# Patient Record
Sex: FEMALE | Race: WHITE | NOT HISPANIC OR LATINO
[De-identification: names, ages, dates, MRNs, and addresses within clinical notes are randomized per-mention and may not be internally consistent; named-entity substitution may affect disease eponyms.]

---

## 2022-09-28 DIAGNOSIS — R92.2 INCONCLUSIVE MAMMOGRAM: ICD-10-CM

## 2022-09-28 DIAGNOSIS — Z11.3 ENCOUNTER FOR SCREENING FOR INFECTIONS WITH A PREDOMINANTLY SEXUAL MODE OF TRANSMISSION: ICD-10-CM

## 2022-09-28 DIAGNOSIS — Z12.31 ENCOUNTER FOR SCREENING MAMMOGRAM FOR MALIGNANT NEOPLASM OF BREAST: ICD-10-CM

## 2022-09-30 ENCOUNTER — NON-APPOINTMENT (OUTPATIENT)
Age: 42
End: 2022-09-30

## 2022-10-03 ENCOUNTER — NON-APPOINTMENT (OUTPATIENT)
Age: 42
End: 2022-10-03

## 2022-10-03 ENCOUNTER — APPOINTMENT (OUTPATIENT)
Dept: OBGYN | Facility: CLINIC | Age: 42
End: 2022-10-03

## 2022-10-03 VITALS
HEIGHT: 63 IN | DIASTOLIC BLOOD PRESSURE: 80 MMHG | SYSTOLIC BLOOD PRESSURE: 120 MMHG | WEIGHT: 162 LBS | BODY MASS INDEX: 28.7 KG/M2

## 2022-10-03 PROCEDURE — 99386 PREV VISIT NEW AGE 40-64: CPT

## 2022-10-03 NOTE — HISTORY OF PRESENT ILLNESS
[TextBox_4] : 41yo  here for new gyn visit.\par H/o abnormal pap in 2006- treated with LEEP.  No cervical shortening during pregnancy.\par H/o PEC with pregnancy and states that her BP has not been the same since then (used to run low and now is 120/80 normally).\par Currently, she has reg periods and no gyn complaints.\par \par She is ; has twins- boy and girl who are 12yo and go to [place].  She is in a same sex relationship.

## 2023-09-13 ENCOUNTER — RESULT REVIEW (OUTPATIENT)
Age: 43
End: 2023-09-13

## 2024-01-24 ENCOUNTER — APPOINTMENT (OUTPATIENT)
Dept: OBGYN | Facility: CLINIC | Age: 44
End: 2024-01-24
Payer: COMMERCIAL

## 2024-01-24 VITALS
DIASTOLIC BLOOD PRESSURE: 74 MMHG | WEIGHT: 161 LBS | SYSTOLIC BLOOD PRESSURE: 120 MMHG | BODY MASS INDEX: 28.53 KG/M2 | HEIGHT: 63 IN

## 2024-01-24 DIAGNOSIS — Z01.419 ENCOUNTER FOR GYNECOLOGICAL EXAMINATION (GENERAL) (ROUTINE) W/OUT ABNORMAL FINDINGS: ICD-10-CM

## 2024-01-24 PROCEDURE — 99396 PREV VISIT EST AGE 40-64: CPT

## 2024-01-24 NOTE — PLAN
[FreeTextEntry1] : Pt to check her portal for thyroid testing with primary care.  Declines sono to evaluate bleeding.  If bleeding continues to be heavy to return for evaluation/ w/u.  Advised reg exercise (pt already doing this) and Vit D supplementation for bone protection

## 2024-01-24 NOTE — HISTORY OF PRESENT ILLNESS
[TextBox_4] : 44yo  here for annual gyn visit. H/o abnormal pap in 2006- treated with LEEP.  Last pap 10/2022 was neg/neg. H/o PEC with pregnancy and states that her BP has not been the same since then (used to run low and now is 120/80 normally). Currently, she has reg periods but has noticed more hot flashes/ night sweats  before periods and sometimes heavier periods.  She has had a primary care visit recently and normal labs testing.  Denies other symptoms.  Her mother had an early menopause.  She is ; has twins- boy and girl who are 10yo and go to [place].  She is in a same sex relationship.

## 2024-02-07 ENCOUNTER — TRANSCRIPTION ENCOUNTER (OUTPATIENT)
Age: 44
End: 2024-02-07

## 2024-03-19 ENCOUNTER — APPOINTMENT (OUTPATIENT)
Dept: OBGYN | Facility: CLINIC | Age: 44
End: 2024-03-19
Payer: COMMERCIAL

## 2024-03-19 ENCOUNTER — ASOB RESULT (OUTPATIENT)
Age: 44
End: 2024-03-19

## 2024-03-19 PROCEDURE — 76830 TRANSVAGINAL US NON-OB: CPT

## 2024-04-24 ENCOUNTER — APPOINTMENT (OUTPATIENT)
Dept: OBGYN | Facility: CLINIC | Age: 44
End: 2024-04-24
Payer: COMMERCIAL

## 2024-04-24 DIAGNOSIS — N92.0 EXCESSIVE AND FREQUENT MENSTRUATION WITH REGULAR CYCLE: ICD-10-CM

## 2024-04-24 LAB
CYTOLOGY CVX/VAG DOC THIN PREP: NORMAL
CYTOLOGY CVX/VAG DOC THIN PREP: NORMAL
HPV HIGH+LOW RISK DNA PNL CVX: NOT DETECTED
HPV HIGH+LOW RISK DNA PNL CVX: NOT DETECTED

## 2024-04-24 PROCEDURE — 99212 OFFICE O/P EST SF 10 MIN: CPT

## 2024-04-24 NOTE — HISTORY OF PRESENT ILLNESS
[Home] : at home, [unfilled] , at the time of the visit. [Medical Office: ([facility])___] : at the medical office located in  [Verbal consent obtained from patient] : the patient, [unfilled] [TextBox_4] : 43yo  for telehealth to f/u after the pelvic sonogram done for heavier periods.  Her last period was only one day but the period before was very heavy.   Denies h/o fibroids.  Was wondering if it's perimenopause; had recent normal TFTs with her primary.

## 2024-04-24 NOTE — PLAN
[FreeTextEntry1] : Discussed options to decrease bleeding including TXA, progesterone only pill (OCP slight contraindication with previous htn in pregnancy), Mirena IUD, EM ablation, hysterectomy.   Pt would like to consider TXA.  Will provide rx; gave instructions on dosing.   F/u in 3-6mo to evaluate.